# Patient Record
Sex: FEMALE | Race: ASIAN | NOT HISPANIC OR LATINO
[De-identification: names, ages, dates, MRNs, and addresses within clinical notes are randomized per-mention and may not be internally consistent; named-entity substitution may affect disease eponyms.]

---

## 2023-05-01 PROBLEM — Z00.00 ENCOUNTER FOR PREVENTIVE HEALTH EXAMINATION: Status: ACTIVE | Noted: 2023-05-01

## 2023-05-02 ENCOUNTER — APPOINTMENT (OUTPATIENT)
Dept: CARDIOLOGY | Facility: CLINIC | Age: 68
End: 2023-05-02
Payer: MEDICARE

## 2023-05-02 ENCOUNTER — TRANSCRIPTION ENCOUNTER (OUTPATIENT)
Age: 68
End: 2023-05-02

## 2023-05-02 ENCOUNTER — NON-APPOINTMENT (OUTPATIENT)
Age: 68
End: 2023-05-02

## 2023-05-02 VITALS
DIASTOLIC BLOOD PRESSURE: 70 MMHG | WEIGHT: 121 LBS | HEIGHT: 63 IN | BODY MASS INDEX: 21.44 KG/M2 | SYSTOLIC BLOOD PRESSURE: 160 MMHG

## 2023-05-02 VITALS — DIASTOLIC BLOOD PRESSURE: 82 MMHG | SYSTOLIC BLOOD PRESSURE: 162 MMHG

## 2023-05-02 DIAGNOSIS — Z87.42 PERSONAL HISTORY OF OTHER DISEASES OF THE FEMALE GENITAL TRACT: ICD-10-CM

## 2023-05-02 DIAGNOSIS — Z78.9 OTHER SPECIFIED HEALTH STATUS: ICD-10-CM

## 2023-05-02 DIAGNOSIS — Z01.810 ENCOUNTER FOR PREPROCEDURAL CARDIOVASCULAR EXAMINATION: ICD-10-CM

## 2023-05-02 DIAGNOSIS — Z83.3 FAMILY HISTORY OF DIABETES MELLITUS: ICD-10-CM

## 2023-05-02 PROCEDURE — 99204 OFFICE O/P NEW MOD 45 MIN: CPT

## 2023-05-02 PROCEDURE — 93000 ELECTROCARDIOGRAM COMPLETE: CPT

## 2023-05-02 RX ORDER — MULTIVIT,IRON,MINERALS/LUTEIN
TABLET ORAL
Refills: 0 | Status: ACTIVE | COMMUNITY

## 2023-05-08 ENCOUNTER — RESULT REVIEW (OUTPATIENT)
Age: 68
End: 2023-05-08

## 2023-05-08 ENCOUNTER — OUTPATIENT (OUTPATIENT)
Dept: OUTPATIENT SERVICES | Facility: HOSPITAL | Age: 68
LOS: 1 days | End: 2023-05-08
Payer: MEDICARE

## 2023-05-08 DIAGNOSIS — R94.31 ABNORMAL ELECTROCARDIOGRAM [ECG] [EKG]: ICD-10-CM

## 2023-05-08 DIAGNOSIS — I10 ESSENTIAL (PRIMARY) HYPERTENSION: ICD-10-CM

## 2023-05-08 PROCEDURE — 78452 HT MUSCLE IMAGE SPECT MULT: CPT

## 2023-05-08 PROCEDURE — 93018 CV STRESS TEST I&R ONLY: CPT

## 2023-05-08 PROCEDURE — A9500: CPT

## 2023-05-08 PROCEDURE — 78452 HT MUSCLE IMAGE SPECT MULT: CPT | Mod: 26,MH

## 2023-05-09 ENCOUNTER — NON-APPOINTMENT (OUTPATIENT)
Age: 68
End: 2023-05-09

## 2023-05-09 DIAGNOSIS — R94.31 ABNORMAL ELECTROCARDIOGRAM [ECG] [EKG]: ICD-10-CM

## 2023-05-09 DIAGNOSIS — I10 ESSENTIAL (PRIMARY) HYPERTENSION: ICD-10-CM

## 2023-05-09 NOTE — HISTORY OF PRESENT ILLNESS
[FreeTextEntry1] : Ms. Simmons is a 68yo F with PMHx of HTN, HLD, uterine prolapse who presents to [facility] care. Her PMD is Dr. Emmanuel Peters. She is pending MOISES-BSO due to prolapse on 05/19/23 with Dr. Angelito Alexis ([facility]). Patient was found to have abnormal EKG, which per documentation is stable. \par -She is feeling well. She denies any symptoms. She recently has been caring for her grandchildren, carrying them upstairs without exertional symptoms. She also recently hiked a mountain without symptoms.

## 2023-05-09 NOTE — REASON FOR VISIT
[Other: ____] : [unfilled] [FreeTextEntry1] : Diagnostic Tests:\par ---------------------\par EKG: \par 05/02/23-NSR. LBBB. \par 05/01/23-NSR. LVH. Incomplete LBBB.

## 2023-05-09 NOTE — ASSESSMENT
[FreeTextEntry1] : Preprocedural cardiac risk assessment: Patient without ACS, ADHF or unstable arrhythmia. RCRI 0 with 3.9% 30 day risk of death, MI or cardiac arrest. Able to perform >4 METS. \par -Patient is low-intermediate risk for intermediate risk procedure.\par -Given LBBB on EKG without previous cardiac work up, will send for Lexiscan nuclear stress.\par -If no ischemic changes, can proceed with surgery without further cardiac work up. \par -Will also attempt TTE prior to procedure. \par \par LBBB: \par -Check TTE and Lexiscan nuclear stress. \par \par HTN: BP not at goal per ACC/AHA 2018 guidelines\par -Patient will check BP at home and keep log. \par -Will likely need to start medical therapy post procedure. \par \par HLD: , LDL 130s (04/23)\par -Not candidate for medical therapy at this time. \par -Discussed therapeutic lifestyle changes to promote improved lipid metabolism. \par \par Follow up in 2 months. \par

## 2023-05-09 NOTE — ADDENDUM
[FreeTextEntry1] : Patient was found to have LBBB on EKG. Unclear if old or new. Patient underwent Lexiscan nuclear stress which showed no evidence of ischemia and normal EF >55%. \par -Patient is low-intermediate risk for intermediate risk procedure.\par -No further cardiac testing needed prior to procedure. \par -Will attach results of stress along with EKG.

## 2023-05-16 DIAGNOSIS — R07.9 CHEST PAIN, UNSPECIFIED: ICD-10-CM

## 2023-05-17 DIAGNOSIS — R07.9 CHEST PAIN, UNSPECIFIED: ICD-10-CM

## 2023-07-06 ENCOUNTER — APPOINTMENT (OUTPATIENT)
Dept: CARDIOLOGY | Facility: CLINIC | Age: 68
End: 2023-07-06
Payer: MEDICARE

## 2023-07-06 PROCEDURE — 93306 TTE W/DOPPLER COMPLETE: CPT

## 2023-07-26 ENCOUNTER — APPOINTMENT (OUTPATIENT)
Dept: CARDIOLOGY | Facility: CLINIC | Age: 68
End: 2023-07-26
Payer: MEDICARE

## 2023-07-26 VITALS
SYSTOLIC BLOOD PRESSURE: 170 MMHG | BODY MASS INDEX: 21.62 KG/M2 | DIASTOLIC BLOOD PRESSURE: 78 MMHG | HEIGHT: 63 IN | HEART RATE: 88 BPM | WEIGHT: 122 LBS

## 2023-07-26 DIAGNOSIS — E78.5 HYPERLIPIDEMIA, UNSPECIFIED: ICD-10-CM

## 2023-07-26 DIAGNOSIS — R94.31 ABNORMAL ELECTROCARDIOGRAM [ECG] [EKG]: ICD-10-CM

## 2023-07-26 DIAGNOSIS — I44.7 LEFT BUNDLE-BRANCH BLOCK, UNSPECIFIED: ICD-10-CM

## 2023-07-26 DIAGNOSIS — I10 ESSENTIAL (PRIMARY) HYPERTENSION: ICD-10-CM

## 2023-07-26 PROCEDURE — 93000 ELECTROCARDIOGRAM COMPLETE: CPT

## 2023-07-26 PROCEDURE — 99213 OFFICE O/P EST LOW 20 MIN: CPT

## 2023-07-26 NOTE — HISTORY OF PRESENT ILLNESS
[FreeTextEntry1] : Ms. Simmons is a 68yo F with PMHx of HTN, HLD, uterine prolapse who presents for follow up. Her PMD is Dr. Emmanuel Peters. She is pending MOISES-BSO due to prolapse on 05/19/23 with Dr. Angelito Alexis ([facility]). Patient was found to have abnormal EKG, which per documentation is stable. \par -She is feeling well. She denies any symptoms. She recently has been caring for her grandchildren, carrying them upstairs without exertional symptoms. She also recently hiked a mountain without symptoms. \par 07/26/23-She went for surgery and did well. She has been checking BP at home. It has been better controlled at home.

## 2023-07-26 NOTE — ASSESSMENT
[FreeTextEntry1] : LBBB: Appears to be intermittent. \par -Negative stress and echo for abnormalities. \par \par HTN: BP not at goal per ACC/AHA 2018 guidelines\par -Patient checked at home. At goal at home checks. \par -Patient will check BP at home and keep log. \par -Will likely need to start medical therapy post procedure. \par \par HLD: , LDL 130s (04/23)\par -Not candidate for medical therapy at this time. \par -Discussed therapeutic lifestyle changes to promote improved lipid metabolism. \par \par Follow up in 6 months. \par -Check labs.

## 2023-07-26 NOTE — REASON FOR VISIT
[Other: ____] : [unfilled] [FreeTextEntry1] : Diagnostic Tests:\par ---------------------\par EKG: \par 07/26/23-NSR. NSTW changes. \par 05/02/23-NSR. LBBB. \par 05/01/23-NSR. LVH. Incomplete LBBB.\par ---------------------\par Echo: \par 07/06/23-TTE: EF 66%. IAS aneurysm. Trace MR. Mild TR. \par ---------------------\par Stress: \par 05/08/23-Lexiscan MPI: Negative for ischemia. LVEF >55%.

## 2024-01-10 ENCOUNTER — APPOINTMENT (OUTPATIENT)
Dept: CARDIOLOGY | Facility: CLINIC | Age: 69
End: 2024-01-10